# Patient Record
Sex: FEMALE | Race: BLACK OR AFRICAN AMERICAN | NOT HISPANIC OR LATINO | Employment: UNEMPLOYED | URBAN - METROPOLITAN AREA
[De-identification: names, ages, dates, MRNs, and addresses within clinical notes are randomized per-mention and may not be internally consistent; named-entity substitution may affect disease eponyms.]

---

## 2017-01-02 ENCOUNTER — HOSPITAL ENCOUNTER (EMERGENCY)
Facility: HOSPITAL | Age: 27
Discharge: HOME | End: 2017-01-02
Attending: EMERGENCY MEDICINE | Admitting: EMERGENCY MEDICINE

## 2017-01-02 VITALS
BODY MASS INDEX: 23.04 KG/M2 | HEART RATE: 105 BPM | OXYGEN SATURATION: 99 % | RESPIRATION RATE: 18 BRPM | TEMPERATURE: 98 F | WEIGHT: 130 LBS | SYSTOLIC BLOOD PRESSURE: 126 MMHG | HEIGHT: 63 IN | DIASTOLIC BLOOD PRESSURE: 64 MMHG

## 2017-01-02 DIAGNOSIS — H10.32 ACUTE CONJUNCTIVITIS OF LEFT EYE, UNSPECIFIED ACUTE CONJUNCTIVITIS TYPE: Primary | ICD-10-CM

## 2017-01-02 PROCEDURE — 25000003 PHARM REV CODE 250: Performed by: EMERGENCY MEDICINE

## 2017-01-02 PROCEDURE — 99283 EMERGENCY DEPT VISIT LOW MDM: CPT

## 2017-01-02 PROCEDURE — 99284 EMERGENCY DEPT VISIT MOD MDM: CPT | Mod: ,,, | Performed by: EMERGENCY MEDICINE

## 2017-01-02 RX ORDER — POLYMYXIN B SULFATE AND TRIMETHOPRIM 1; 10000 MG/ML; [USP'U]/ML
1 SOLUTION OPHTHALMIC
Status: COMPLETED | OUTPATIENT
Start: 2017-01-02 | End: 2017-01-02

## 2017-01-02 RX ORDER — POLYMYXIN B SULFATE AND TRIMETHOPRIM 1; 10000 MG/ML; [USP'U]/ML
1 SOLUTION OPHTHALMIC EVERY 6 HOURS
Qty: 2.6667 ML | Refills: 0 | Status: SHIPPED | OUTPATIENT
Start: 2017-01-02 | End: 2017-01-12

## 2017-01-02 RX ORDER — PROPARACAINE HYDROCHLORIDE 5 MG/ML
1 SOLUTION/ DROPS OPHTHALMIC
Status: COMPLETED | OUTPATIENT
Start: 2017-01-02 | End: 2017-01-02

## 2017-01-02 RX ADMIN — POLYMYXIN B SULFATE AND TRIMETHOPRIM SULFATE 1 DROP: 10000; 1 SOLUTION/ DROPS OPHTHALMIC at 04:01

## 2017-01-02 RX ADMIN — FLUORESCEIN SODIUM 1 STRIP: 1 STRIP OPHTHALMIC at 04:01

## 2017-01-02 RX ADMIN — PROPARACAINE HYDROCHLORIDE 1 DROP: 5 SOLUTION/ DROPS OPHTHALMIC at 04:01

## 2017-01-02 NOTE — DISCHARGE INSTRUCTIONS
What Is Conjunctivitis?  Conjunctivitis is an irritation or infection. It affects the membrane that covers the white of your eye and the inside of your eyelid (conjunctiva). It can happen to one or both eyes. The membrane swells and the blood vessels enlarge (dilate). This makes your eye red. That's why conjunctivitis is sometimes called red eye or pink eye.    What are the symptoms?  If you have one or more of these symptoms, see an eye doctor:  · Redness in and around your eye  · Eyes that are puffy and sore  · Itching, burning, or stinging eyes  · Watery eyes or discharge from your eye  · Eyelids that are crusty or stuck together when you wake up in the morning  · Pink color in the whites of one or both eyes  Getting treatment quickly can help prevent damage to your eyes.    How is it diagnosed?  Conjunctivitis is usually a minor eye infection. But it can sometimes become a more serious problem. Some more serious eye diseases have symptoms that look like conjunctivitis. So it's important for an eye doctor to diagnose you. Your eye doctor will ask about your symptoms and any medicines you take. He or she will ask about any illnesses or medical conditions you may have. The doctor will also check your eyes with a hand-held light and a special microscope called a slit lamp.  © 3819-6110 [employer]. [address] [place] [address], [place] [address]. All rights reserved. This information is not intended as a substitute for professional medical care. Always follow your healthcare professional's instructions.

## 2017-01-02 NOTE — ED TRIAGE NOTES
Pt states she woke about 0300 and noticed pain in her left eye. Pts left eye is red. Pt denies injury to eye.

## 2017-01-02 NOTE — ED NOTES
Pt developed redness and pain in her left eye.    LOC: The patient is awake, alert, and oriented to place, time, situation. Affect is appropriated.  Speech is appropriate and clear.     APPEARANCE: Patient resting comfortably in no acute distress.  Patient is clean and well groomed.    SKIN: The skin is warm and dry; color consistent with ethnicity.  Patient has normal skin turgor and moist mucus membranes.  Skin intact; no breakdown or bruising noted.     MUSCULOSKELETAL: Patient moving upper and lower extremities without difficulty.  Denies weakness.     RESPIRATORY: Airway is open and patent.  Respirations spontaneous, even, easy, and non-labored.  Patient has a normal effort and rate.  No accessory muscle use noted. Denies cough.     CARDIAC:   No peripheral edema noted.   No complaints of chest pain      ABDOMEN: Soft and non tender to palpation.  No distention noted.     NEUROLOGIC: PERRLA;  eyes open spontaneously.  Behavior appropriate to situation.  Follows commands; facial expression symmetrical; equal hand grasps bilaterally.  Purposeful motor response noted; normal sensation in all extremities.

## 2017-01-02 NOTE — ED AVS SNAPSHOT
[facility]  [address] [facility] [id number]               Glendy Yanez   2017  4:02 AM   ED    Description:  Female : 1990   Department:  [facility]           Your Care was Coordinated By:     Provider Role From To    Armani Krishnamurthy III, MD Attending Provider 17 0409 --      Reason for Visit     Eye Problem           Diagnoses this Visit        Comments    Acute conjunctivitis of left eye, unspecified acute conjunctivitis type    -  Primary       ED Disposition     None           To Do List           Follow-up Information     Follow up with [facility]. Schedule an appointment as soon as possible for a visit in 2 days.    Specialty:  Ophthalmology    Contact information:    [address] [facility] [id number] [phone]    Additional information:    10th Floor    Dr. Alexander patients please go to the 1st Floor Optical Shop for your appointment.        These Medications        Disp Refills Start End    polymyxin B sulf-trimethoprim (POLYTRIM) 10,000 unit- 1 mg/mL Drop 2.6667 mL 0 2017    Place 1 drop into the left eye every 6 (six) hours. - Left Eye    Pharmacy: [facility] [address] [place] [address] #: [phone]         [facility]     [facility] -  Assistance  Registered nurses in the [facility] provide clinical advisement, health education, appointment booking, and other advisory services.  Call for this free service at [phone].             Medications           Message regarding Medications     Verify the changes and/or additions to your medication regime listed below are the same as discussed with your clinician today.  If any of these changes or additions are incorrect, please notify your healthcare provider.        START taking these NEW medications        Refills     "polymyxin B sulf-trimethoprim (POLYTRIM) 10,000 unit- 1 mg/mL Drop 0    Sig: Place 1 drop into the left eye every 6 (six) hours.    Class: Print    Route: Left Eye      These medications were administered today        Dose Freq    fluorescein ophthalmic strip 1 strip 1 strip ED 1 Time    Sig: Place 1 strip into both eyes ED 1 Time.    Class: Normal    Route: Both Eyes    proparacaine 0.5 % ophthalmic solution 1 drop 1 drop ED 1 Time    Sig: Place 1 drop into both eyes ED 1 Time.    Class: Normal    Route: Both Eyes    polymyxin B sulf-trimethoprim 10,000 unit- 1 mg/mL ophthalmic drops 1 drop 1 drop ED 1 Time    Sig: Place 1 drop into the left eye ED 1 Time.    Class: Normal    Route: Left Eye           Verify that the below list of medications is an accurate representation of the medications you are currently taking.  If none reported, the list may be blank. If incorrect, please contact your healthcare provider. Carry this list with you in case of emergency.           Current Medications     docusate sodium (COLACE) 100 MG capsule Take 1 capsule (100 mg total) by mouth 2 (two) times daily as needed for Constipation.    iron polysaccharides (NIFEREX) 150 mg iron Cap Take 1 capsule (150 mg total) by mouth 2 (two) times daily.    polymyxin B sulf-trimethoprim (POLYTRIM) 10,000 unit- 1 mg/mL Drop Place 1 drop into the left eye every 6 (six) hours.    polymyxin B sulf-trimethoprim 10,000 unit- 1 mg/mL ophthalmic drops 1 drop Place 1 drop into the left eye ED 1 Time.           Clinical Reference Information           Your Vitals Were     BP Pulse Temp Resp Height Weight    126/64 105 98.3 °F (36.8 °C) (Oral) 18 5' 3" (1.6 m) 59 kg (130 lb)    Last Period SpO2 BMI          12/02/2016 (Approximate) 99% 23.03 kg/m2        Allergies as of 1/2/2017     No Known Allergies      Immunizations Administered on Date of Encounter - 1/2/2017     None      ED Micro, Lab, POCT     None      ED Imaging Orders     None        Discharge " Instructions         What Is Conjunctivitis?  Conjunctivitis is an irritation or infection. It affects the membrane that covers the white of your eye and the inside of your eyelid (conjunctiva). It can happen to one or both eyes. The membrane swells and the blood vessels enlarge (dilate). This makes your eye red. That's why conjunctivitis is sometimes called red eye or pink eye.    What are the symptoms?  If you have one or more of these symptoms, see an eye doctor:  · Redness in and around your eye  · Eyes that are puffy and sore  · Itching, burning, or stinging eyes  · Watery eyes or discharge from your eye  · Eyelids that are crusty or stuck together when you wake up in the morning  · Pink color in the whites of one or both eyes  Getting treatment quickly can help prevent damage to your eyes.    How is it diagnosed?  Conjunctivitis is usually a minor eye infection. But it can sometimes become a more serious problem. Some more serious eye diseases have symptoms that look like conjunctivitis. So it's important for an eye doctor to diagnose you. Your eye doctor will ask about your symptoms and any medicines you take. He or she will ask about any illnesses or medical conditions you may have. The doctor will also check your eyes with a hand-held light and a special microscope called a slit lamp.  © 9802-6654 [employer]. [address] [place] [address], [place] [address]. All rights reserved. This information is not intended as a substitute for professional medical care. Always follow your healthcare professional's instructions.          [facility] Sign-Up     Activating your [facility] account is as easy as 1-2-3!     1) Visit [email or web address], select Sign Up Now, enter this activation code and your date of birth, then select Next.  [email or web address]  Expires: 2/16/2017  4:37 AM      2) Create a username and password to use when you visit [facility] in the future and select a security question in case you lose your  password and select Next.    3) Enter your e-mail address and click Sign Up!    Additional Information  If you have questions, please e-mail [email or web address] or call [phone] to talk to our [facility] staff. Remember, [facility] is NOT to be used for urgent needs. For medical emergencies, dial 911.         Smoking Cessation     If you would like to quit smoking:   You may be eligible for free services if you are a [place] resident and started smoking cigarettes before September 1, 1990.  Call the Smoking Cessation Trust (SCT) toll free at (361) 727-5861 or (632) 959-4206.   Call 5-746-QUIT-NOW if you do not meet the above criteria.             [facility] complies with applicable Federal civil rights laws and does not discriminate on the basis of race, color, national origin, age, disability, or sex.        Language Assistance Services     ATTENTION: Language assistance services are available, free of charge. Please call [phone].      ATENCIÓN: Si habla español, tiene a mujica disposición servicios gratuitos de asistencia lingüística. Llame al [phone].     CHÚ Ý: N?u b?n nói Ti?ng Vi?t, có các d?ch v? h? tr? ngôn ng? mi?n phí dành cho b?n. G?i s? [phone].

## 2017-01-02 NOTE — ED PROVIDER NOTES
Encounter Date: 2017    SCRIBE #1 NOTE: I, Zully Anderson, am scribing for, and in the presence of,  Dr. Krishnamurthy. I have scribed the entire note.       History     Chief Complaint   Patient presents with    Eye Problem     Reports waking up with pain and redness to left eye. Sclera is pink     Review of patient's allergies indicates:  No Known Allergies  HPI Comments: Time patient was seen by the provider: 4:12 AM      The patient is a 26 y.o. female with history of: HTN and DM that presents to the ED with a complaint of left eye redness and pain that woke her up just prior to arrival. Patient reports that when she went to bed her eye was normal and denies trauma or contact with anyone who has similar symptoms. She denies photophobia, discharge, or vision changes.            History reviewed. No pertinent past medical history.  Past Medical History Pertinent Negatives   Diagnosis Date Noted    Diabetes mellitus 2013    Hypertension 2013     Past Surgical History   Procedure Laterality Date     section, classic       Family History   Problem Relation Age of Onset    Diabetes Maternal Grandmother     Breast cancer Neg Hx     Colon cancer Neg Hx     Ovarian cancer Neg Hx      Social History   Substance Use Topics    Smoking status: Former Smoker    Smokeless tobacco: None    Alcohol use Yes     Review of Systems   Eyes: Positive for pain (left) and redness (left). Negative for photophobia, discharge and visual disturbance.   Respiratory: Negative for cough.        Physical Exam   Initial Vitals   BP Pulse Resp Temp SpO2   17 0354 17 0354 17 0354 17 0354 17 0354   126/64 105 18 98.3 °F (36.8 °C) 99 %     Physical Exam    Nursing note and vitals reviewed.  Constitutional: She appears well-developed and well-nourished. She is not diaphoretic. No distress.   HENT:   Head: Normocephalic.   Right Ear: External ear normal.   Left Ear: External ear normal.    Mouth/Throat: Oropharynx is clear and moist.   Eyes: EOM are normal. Pupils are equal, round, and reactive to light. Left conjunctiva is injected.   Sclera on the left injected. No purulent discharge. Normal fundoscopic exam. Visual acuity normal. Negative fluorescein exam.    Neck: Normal range of motion. Neck supple.   Skin: Skin is warm and dry. No rash noted. No erythema.         ED Course   Procedures  Labs Reviewed - No data to display          Medical Decision Making:   History:   Old Medical Records: I decided to obtain old medical records.  Initial Assessment:   Patient with redness to left eye consistent with conjunctivitis. Will start antibiotics and discharge home.             Scribe Attestation:   Scribe #1: I performed the above scribed service and the documentation accurately describes the services I performed. I attest to the accuracy of the note.    Attending Attestation:           Physician Attestation for Scribe:  Physician Attestation Statement for Scribe #1: I, Dr. Krishnamurthy, reviewed documentation, as scribed by Zully Anderson in my presence, and it is both accurate and complete.                 ED Course     Clinical Impression:   The encounter diagnosis was Acute conjunctivitis of left eye, unspecified acute conjunctivitis type.    Disposition:   Disposition: Discharged  Condition: Stable       Armani Krishnamurthy III, MD  01/02/17 1410

## 2019-05-25 ENCOUNTER — HOSPITAL ENCOUNTER (EMERGENCY)
Facility: HOSPITAL | Age: 29
Discharge: HOME | End: 2019-05-25
Attending: EMERGENCY MEDICINE
Payer: MEDICAID

## 2019-05-25 VITALS
OXYGEN SATURATION: 99 % | SYSTOLIC BLOOD PRESSURE: 107 MMHG | BODY MASS INDEX: 23.04 KG/M2 | DIASTOLIC BLOOD PRESSURE: 68 MMHG | HEIGHT: 63 IN | TEMPERATURE: 98 F | RESPIRATION RATE: 18 BRPM | HEART RATE: 100 BPM | WEIGHT: 130 LBS

## 2019-05-25 DIAGNOSIS — K64.9 HEMORRHOIDS, UNSPECIFIED HEMORRHOID TYPE: Primary | ICD-10-CM

## 2019-05-25 LAB
ANISOCYTOSIS BLD QL SMEAR: SLIGHT
BASOPHILS # BLD AUTO: 0.02 K/UL (ref 0–0.2)
BASOPHILS NFR BLD: 0.5 % (ref 0–1.9)
DIFFERENTIAL METHOD: ABNORMAL
EOSINOPHIL # BLD AUTO: 0.1 K/UL (ref 0–0.5)
EOSINOPHIL NFR BLD: 1.3 % (ref 0–8)
ERYTHROCYTE [DISTWIDTH] IN BLOOD BY AUTOMATED COUNT: 14.3 % (ref 11.5–14.5)
HCT VFR BLD AUTO: 35.1 % (ref 37–48.5)
HGB BLD-MCNC: 11.1 G/DL (ref 12–16)
IMM GRANULOCYTES # BLD AUTO: 0.01 K/UL (ref 0–0.04)
IMM GRANULOCYTES NFR BLD AUTO: 0.3 % (ref 0–0.5)
LYMPHOCYTES # BLD AUTO: 1.5 K/UL (ref 1–4.8)
LYMPHOCYTES NFR BLD: 37 % (ref 18–48)
MCH RBC QN AUTO: 28.8 PG (ref 27–31)
MCHC RBC AUTO-ENTMCNC: 31.6 G/DL (ref 32–36)
MCV RBC AUTO: 91 FL (ref 82–98)
MONOCYTES # BLD AUTO: 0.2 K/UL (ref 0.3–1)
MONOCYTES NFR BLD: 6 % (ref 4–15)
NEUTROPHILS # BLD AUTO: 2.2 K/UL (ref 1.8–7.7)
NEUTROPHILS NFR BLD: 54.9 % (ref 38–73)
NRBC BLD-RTO: 0 /100 WBC
PLATELET # BLD AUTO: 223 K/UL (ref 150–350)
PLATELET BLD QL SMEAR: ABNORMAL
PMV BLD AUTO: 11.1 FL (ref 9.2–12.9)
POIKILOCYTOSIS BLD QL SMEAR: SLIGHT
RBC # BLD AUTO: 3.86 M/UL (ref 4–5.4)
WBC # BLD AUTO: 3.97 K/UL (ref 3.9–12.7)

## 2019-05-25 PROCEDURE — 99284 EMERGENCY DEPT VISIT MOD MDM: CPT | Mod: ,,, | Performed by: PHYSICIAN ASSISTANT

## 2019-05-25 PROCEDURE — 99284 EMERGENCY DEPT VISIT MOD MDM: CPT

## 2019-05-25 PROCEDURE — 85025 COMPLETE CBC W/AUTO DIFF WBC: CPT

## 2019-05-25 PROCEDURE — 99284 PR EMERGENCY DEPT VISIT,LEVEL IV: ICD-10-PCS | Mod: ,,, | Performed by: PHYSICIAN ASSISTANT

## 2019-05-25 RX ORDER — DOCUSATE SODIUM 100 MG/1
100 CAPSULE, LIQUID FILLED ORAL DAILY
Qty: 60 CAPSULE | Refills: 1 | Status: SHIPPED | OUTPATIENT
Start: 2019-05-25

## 2019-05-25 NOTE — ED TRIAGE NOTES
"Pt cc of bleeding hemorrhoids. Pt had hemorrhoids since more than 10 years ago from pregnancy however new onset of bleeding. Pt states that her period is ended now however there was " too much blood" for it to be from her period. Pt reports seeing blood clots as well. Pt denies sob, dizziness, chest pain. And denies n/v/d/, . Pt reports pain 10/10 right now and can't sit directly on bottom.  she noticed this bleeding this morning.      Family not with pt at this time.           Psychosocial:  Patient is calm and cooperative.  Patients insight and judgement are appropriate to situation.  Appears clean, well maintained, with clothing appropriate to environment.  No evidence of delusions, hallucinations, or psychosis.     Neuro:  Eyes open spontaneously.  Awake, alert, oriented x 4.  Speech clear and appropriate.  Tolerating saliva secretions well.  Able to follow commands, demonstrating ability to actively and appropriately communicate within context of current conversation.  Symmetrical facial muscles.  Moving all extremities well with no noted weakness.  Adequate muscle tone present.    Movement is purposeful.       Airway:  Bilateral chest rise and fall.  RR regular and non-labored.  Air entry patent with and clear x 5 lobes of the lungs.  No crepitus or subcutaneous emphysema noted on palpation.       Circulatory:  Skin warm, dry, and pink.  Apical and radial pulses strong and regular.  Capillary refill/skin blanching less than 3 seconds to distal of 4 extremities.     Abdomen:  Abdomen soft and non-distended.  Positive normo-active bowel sounds x 4 quadrants.       Urinary: Denies pressure, frequency, urgency, odor or pain.     Extremities:  No redness, heat, deformity, or pain.     Skin:  Intact with no bruising/discolorations noted.    "

## 2019-05-25 NOTE — DISCHARGE INSTRUCTIONS
Take your stool softener daily.  You may apply Proctofoam to rectum to relieve pain and itching.  Follow-up with your PCP.  Return to the ED for any concerning symptoms.     Our goal in the emergency department is to always give you outstanding care and exceptional service. You may receive a survey by mail or e-mail in the next week regarding your experience in our ED. We would greatly appreciate your completing and returning the survey. Your feedback provides us with a way to recognize our staff who give very good care and it helps us learn how to improve when your experience was below our aspiration of excellence.

## 2019-05-25 NOTE — ED PROVIDER NOTES
Encounter Date: 2019       History     Chief Complaint   Patient presents with    Hemorrhoids     been having hemorrhoid, now bleeding     Patient is a 29-yo AAF with no pertinent PMHx on file who presents to the ED for urgent evaluation of hemorrhoids. Patient reports a 10-year history of painless hemorrhoids since the birth of her first child. She reports within the past week, it has become tender with bleeding. She states last night and this morning there was bright red blood that filled the toilet bowl. She denies blood in the stool. She does report generalized weakness. She endorses intermittent constipation.  She does not take any daily medications. She denies fever/chills, HA, SOB, chest pain, abdominal pain, diarrhea, vaginal discharge, dysuria, or focal weakness.     The history is provided by the patient.     Review of patient's allergies indicates:  No Known Allergies  No past medical history on file.  Past Surgical History:   Procedure Laterality Date     SECTION, CLASSIC       SECTION, WITH TUBAL LIGATION Bilateral 1/10/2014    Performed by [facility] L[facility]D     Family History   Problem Relation Age of Onset    Diabetes Maternal Grandmother     Breast cancer Neg Hx     Colon cancer Neg Hx     Ovarian cancer Neg Hx      Social History     Tobacco Use    Smoking status: Former Smoker   Substance Use Topics    Alcohol use: Yes    Drug use: No     Review of Systems   Constitutional: Negative for chills and fever.   HENT: Negative for sore throat.    Eyes: Negative for visual disturbance.   Respiratory: Negative for shortness of breath.    Cardiovascular: Negative for chest pain.   Gastrointestinal: Positive for anal bleeding, constipation and rectal pain. Negative for abdominal pain, blood in stool, diarrhea, nausea and vomiting.   Genitourinary: Positive for vaginal discharge. Negative for dysuria and hematuria.   Musculoskeletal: Negative for back pain.    Skin: Negative for wound.   Neurological: Positive for weakness. Negative for dizziness.   Psychiatric/Behavioral: Negative for dysphoric mood.       Physical Exam     Initial Vitals [05/25/19 1319]   BP Pulse Resp Temp SpO2   107/68 100 18 98.3 °F (36.8 °C) 99 %      MAP       --         Physical Exam    Nursing note and vitals reviewed.  Constitutional: She appears well-developed and well-nourished. She is not diaphoretic. No distress.   HENT:   Head: Normocephalic and atraumatic.   Mouth/Throat: Oropharynx is clear and moist. No oropharyngeal exudate.   Eyes: Conjunctivae and EOM are normal. Pupils are equal, round, and reactive to light.   Neck: Normal range of motion. Neck supple.   Cardiovascular: Normal rate, regular rhythm, normal heart sounds and intact distal pulses.   Pulmonary/Chest: Breath sounds normal. No respiratory distress. She has no wheezes. She has no rhonchi. She has no rales.   Abdominal: Soft. Bowel sounds are normal. There is no tenderness.   Genitourinary:   Genitourinary Comments: MORALES performed under RN supervision. Notable for a non-thrombosed prolapsed internal hemorrhoid. No active bleeding. Easily reducible. Heme-occult negative stool. No anal fissures.    Musculoskeletal: Normal range of motion. She exhibits no edema or tenderness.   Neurological: She is alert and oriented to person, place, and time.   Skin: Skin is warm and dry.   Psychiatric: She has a normal mood and affect. Thought content normal.         ED Course   Procedures  Labs Reviewed   CBC W/ AUTO DIFFERENTIAL - Abnormal; Notable for the following components:       Result Value    RBC 3.86 (*)     Hemoglobin 11.1 (*)     Hematocrit 35.1 (*)     Mean Corpuscular Hemoglobin Conc 31.6 (*)     Mono # 0.2 (*)     All other components within normal limits          Imaging Results    None          Medical Decision Making:   History:   Old Medical Records: I decided to obtain old medical records.  Initial Assessment:   Patient  is a 29-yo AAF with no pertinent PMHx on file who presents to the ED for urgent evaluation of hemorrhoids PE reveals a nontoxic, afebrile, well-appearing female in no acute distress.  Vital signs are stable.  Differential Diagnosis:   Differential diagnosis includes but is not limited to:  Strangulated hemorrhoid, anal fissure, polyps.  Clinical Tests:   Lab Tests: Ordered and Reviewed  ED Management:  Will obtain a CBC to check H&H in setting of bleeding.    H&H stable at 11 and 35 (increased from previous result).  MORALES without evidence of thrombosis.  No active bleeding.  I feel patient is stable for discharge home with a daily stool softener and Proctofoam for pain. Encouraged follow-up with her PCP.  ED return precautions given.  Patient verbalized understanding and is agreeable. I have discussed patient case with my supervisory physician, who is in agreement with my assessment and plan.                        Clinical Impression:       ICD-10-CM ICD-9-CM   1. Hemorrhoids, unspecified hemorrhoid type K64.9 455.6         Disposition:   Disposition: Discharged  Condition: Stable                        Elvia Bunch PA-C  05/25/19 1915